# Patient Record
Sex: MALE | URBAN - METROPOLITAN AREA
[De-identification: names, ages, dates, MRNs, and addresses within clinical notes are randomized per-mention and may not be internally consistent; named-entity substitution may affect disease eponyms.]

---

## 2020-02-11 ENCOUNTER — LAB REQUISITION (OUTPATIENT)
Dept: LAB | Facility: HOSPITAL | Age: 80
End: 2020-02-11

## 2020-02-11 DIAGNOSIS — Z00.00 ROUTINE GENERAL MEDICAL EXAMINATION AT A HEALTH CARE FACILITY: ICD-10-CM

## 2020-02-11 LAB
ANION GAP SERPL CALCULATED.3IONS-SCNC: 16 MMOL/L (ref 5–15)
BACTERIA UR QL AUTO: ABNORMAL /HPF
BASOPHILS # BLD AUTO: 0.04 10*3/MM3 (ref 0–0.2)
BASOPHILS NFR BLD AUTO: 0.5 % (ref 0–1.5)
BILIRUB UR QL STRIP: NEGATIVE
BUN BLD-MCNC: 16 MG/DL (ref 8–23)
BUN/CREAT SERPL: 14 (ref 7–25)
CALCIUM SPEC-SCNC: 10.4 MG/DL (ref 8.6–10.5)
CHLORIDE SERPL-SCNC: 99 MMOL/L (ref 98–107)
CLARITY UR: ABNORMAL
CO2 SERPL-SCNC: 25 MMOL/L (ref 22–29)
COLOR UR: YELLOW
CREAT BLD-MCNC: 1.14 MG/DL (ref 0.76–1.27)
DEPRECATED RDW RBC AUTO: 45.5 FL (ref 37–54)
EOSINOPHIL # BLD AUTO: 0.17 10*3/MM3 (ref 0–0.4)
EOSINOPHIL NFR BLD AUTO: 2.2 % (ref 0.3–6.2)
ERYTHROCYTE [DISTWIDTH] IN BLOOD BY AUTOMATED COUNT: 13.2 % (ref 12.3–15.4)
GFR SERPL CREATININE-BSD FRML MDRD: 62 ML/MIN/1.73
GFR SERPL CREATININE-BSD FRML MDRD: 75 ML/MIN/1.73
GLUCOSE BLD-MCNC: 109 MG/DL (ref 65–99)
GLUCOSE UR STRIP-MCNC: NEGATIVE MG/DL
HCT VFR BLD AUTO: 44.8 % (ref 37.5–51)
HGB BLD-MCNC: 15 G/DL (ref 13–17.7)
HGB UR QL STRIP.AUTO: NEGATIVE
HYALINE CASTS UR QL AUTO: ABNORMAL /LPF
IMM GRANULOCYTES # BLD AUTO: 0.02 10*3/MM3 (ref 0–0.05)
IMM GRANULOCYTES NFR BLD AUTO: 0.3 % (ref 0–0.5)
KETONES UR QL STRIP: NEGATIVE
LEUKOCYTE ESTERASE UR QL STRIP.AUTO: ABNORMAL
LYMPHOCYTES # BLD AUTO: 1.54 10*3/MM3 (ref 0.7–3.1)
LYMPHOCYTES NFR BLD AUTO: 19.8 % (ref 19.6–45.3)
MCH RBC QN AUTO: 31.7 PG (ref 26.6–33)
MCHC RBC AUTO-ENTMCNC: 33.5 G/DL (ref 31.5–35.7)
MCV RBC AUTO: 94.7 FL (ref 79–97)
MONOCYTES # BLD AUTO: 0.67 10*3/MM3 (ref 0.1–0.9)
MONOCYTES NFR BLD AUTO: 8.6 % (ref 5–12)
NEUTROPHILS # BLD AUTO: 5.34 10*3/MM3 (ref 1.7–7)
NEUTROPHILS NFR BLD AUTO: 68.6 % (ref 42.7–76)
NITRITE UR QL STRIP: POSITIVE
NRBC BLD AUTO-RTO: 0 /100 WBC (ref 0–0.2)
PH UR STRIP.AUTO: 6.5 [PH] (ref 5–8)
PLATELET # BLD AUTO: 213 10*3/MM3 (ref 140–450)
PMV BLD AUTO: 11.7 FL (ref 6–12)
POTASSIUM BLD-SCNC: 4 MMOL/L (ref 3.5–5.2)
PROT UR QL STRIP: NEGATIVE
RBC # BLD AUTO: 4.73 10*6/MM3 (ref 4.14–5.8)
RBC # UR: ABNORMAL /HPF
REF LAB TEST METHOD: ABNORMAL
SODIUM BLD-SCNC: 140 MMOL/L (ref 136–145)
SP GR UR STRIP: 1.01 (ref 1–1.03)
SQUAMOUS #/AREA URNS HPF: ABNORMAL /HPF
UROBILINOGEN UR QL STRIP: ABNORMAL
WBC NRBC COR # BLD: 7.78 10*3/MM3 (ref 3.4–10.8)
WBC UR QL AUTO: ABNORMAL /HPF

## 2020-02-11 PROCEDURE — 81001 URINALYSIS AUTO W/SCOPE: CPT

## 2020-02-11 PROCEDURE — 85025 COMPLETE CBC W/AUTO DIFF WBC: CPT

## 2020-02-11 PROCEDURE — 80048 BASIC METABOLIC PNL TOTAL CA: CPT

## 2020-06-24 ENCOUNTER — LAB REQUISITION (OUTPATIENT)
Dept: LAB | Facility: HOSPITAL | Age: 80
End: 2020-06-24

## 2020-06-24 DIAGNOSIS — Z00.00 ROUTINE GENERAL MEDICAL EXAMINATION AT A HEALTH CARE FACILITY: ICD-10-CM

## 2020-06-24 LAB
BASOPHILS # BLD AUTO: 0.04 10*3/MM3 (ref 0–0.2)
BASOPHILS NFR BLD AUTO: 0.5 % (ref 0–1.5)
DEPRECATED RDW RBC AUTO: 43.8 FL (ref 37–54)
EOSINOPHIL # BLD AUTO: 0.13 10*3/MM3 (ref 0–0.4)
EOSINOPHIL NFR BLD AUTO: 1.8 % (ref 0.3–6.2)
ERYTHROCYTE [DISTWIDTH] IN BLOOD BY AUTOMATED COUNT: 13.1 % (ref 12.3–15.4)
HCT VFR BLD AUTO: 47.9 % (ref 37.5–51)
HGB BLD-MCNC: 15.8 G/DL (ref 13–17.7)
IMM GRANULOCYTES # BLD AUTO: 0.04 10*3/MM3 (ref 0–0.05)
IMM GRANULOCYTES NFR BLD AUTO: 0.5 % (ref 0–0.5)
LYMPHOCYTES # BLD AUTO: 1.33 10*3/MM3 (ref 0.7–3.1)
LYMPHOCYTES NFR BLD AUTO: 17.9 % (ref 19.6–45.3)
MCH RBC QN AUTO: 30.4 PG (ref 26.6–33)
MCHC RBC AUTO-ENTMCNC: 33 G/DL (ref 31.5–35.7)
MCV RBC AUTO: 92.3 FL (ref 79–97)
MONOCYTES # BLD AUTO: 0.88 10*3/MM3 (ref 0.1–0.9)
MONOCYTES NFR BLD AUTO: 11.9 % (ref 5–12)
NEUTROPHILS # BLD AUTO: 4.99 10*3/MM3 (ref 1.7–7)
NEUTROPHILS NFR BLD AUTO: 67.4 % (ref 42.7–76)
NRBC BLD AUTO-RTO: 0 /100 WBC (ref 0–0.2)
PLATELET # BLD AUTO: 327 10*3/MM3 (ref 140–450)
PMV BLD AUTO: 12.2 FL (ref 6–12)
RBC # BLD AUTO: 5.19 10*6/MM3 (ref 4.14–5.8)
WBC NRBC COR # BLD: 7.41 10*3/MM3 (ref 3.4–10.8)

## 2020-06-24 PROCEDURE — 85025 COMPLETE CBC W/AUTO DIFF WBC: CPT

## 2020-06-25 ENCOUNTER — LAB REQUISITION (OUTPATIENT)
Dept: LAB | Facility: HOSPITAL | Age: 80
End: 2020-06-25

## 2020-06-25 DIAGNOSIS — Z00.00 ROUTINE GENERAL MEDICAL EXAMINATION AT A HEALTH CARE FACILITY: ICD-10-CM

## 2020-06-25 LAB
ANION GAP SERPL CALCULATED.3IONS-SCNC: 13 MMOL/L (ref 5–15)
BUN BLD-MCNC: 42 MG/DL (ref 8–23)
BUN/CREAT SERPL: 37.2 (ref 7–25)
CALCIUM SPEC-SCNC: 9.7 MG/DL (ref 8.6–10.5)
CHLORIDE SERPL-SCNC: 104 MMOL/L (ref 98–107)
CO2 SERPL-SCNC: 23 MMOL/L (ref 22–29)
CREAT BLD-MCNC: 1.13 MG/DL (ref 0.76–1.27)
GFR SERPL CREATININE-BSD FRML MDRD: 62 ML/MIN/1.73
GFR SERPL CREATININE-BSD FRML MDRD: 76 ML/MIN/1.73
GLUCOSE BLD-MCNC: 101 MG/DL (ref 65–99)
POTASSIUM BLD-SCNC: 4.2 MMOL/L (ref 3.5–5.2)
SODIUM BLD-SCNC: 140 MMOL/L (ref 136–145)

## 2020-06-25 PROCEDURE — 80048 BASIC METABOLIC PNL TOTAL CA: CPT

## 2024-07-18 ENCOUNTER — DOCUMENTATION (OUTPATIENT)
Dept: FAMILY MEDICINE CLINIC | Facility: CLINIC | Age: 84
End: 2024-07-18
Payer: MEDICARE

## 2024-07-18 RX ORDER — ASPIRIN 81 MG/1
81 TABLET, CHEWABLE ORAL DAILY
COMMUNITY

## 2024-07-18 RX ORDER — TAMSULOSIN HYDROCHLORIDE 0.4 MG/1
1 CAPSULE ORAL DAILY
COMMUNITY

## 2024-07-18 RX ORDER — CITALOPRAM 20 MG/1
20 TABLET ORAL DAILY
COMMUNITY

## 2024-07-18 RX ORDER — AMLODIPINE BESYLATE 10 MG/1
10 TABLET ORAL DAILY
COMMUNITY

## 2024-07-18 RX ORDER — ATORVASTATIN CALCIUM 80 MG/1
80 TABLET, FILM COATED ORAL NIGHTLY
COMMUNITY

## 2024-07-24 NOTE — PROGRESS NOTES
Nursing Home History and Physical       Danyel BorjasDO isamar  [address] [place] [address] Phone: [phone]  Fax: [phone]     PATIENT NAME: Last Gibson                                                                          YOB: 1940           DATE OF SERVICE: 07/25/2024  FACILITY:  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    He was recently hospitalized at [facility] due to concerns of worsening left-sided weakness and altered mental status. A stroke workup, including a bubble study, did not reveal any shunt, revealed an ejection fraction of 70 to 75 percent. Due to debility and weakness, he was then transferred to this facility for further care and rehabilitation. On exam today, nurses report that he has been more fatigued and lethargic.  Yesterday he was doing better with PT. He reported no other associated symptoms, denying any chest pain, nausea, abdominal pain, or urinary issues. However, he did report one episode of diarrhea, which may have been triggered by medications yesterday.        PAST MEDICAL & SURGICAL HISTORY:   Past Medical History:   Diagnosis Date    CVA (cerebral vascular accident)     Dysarthria     Dyslipidemia     Facial droop,left     Hemiplegia and hemiparesis following cerebral infarction affecting left non-dominant side     History of cerebral artery stenosis     History of multiple cerebrovascular accidents (CVAs)     Hyperlipidemia     Hypertension     Left-sided weakness     Major depression     Moderate cognitive impairment     Stroke     Urinary retention       No past surgical history on file.      MEDICATIONS:  I have reviewed and reconciled the patients  medication list in the patients chart at the [facility] on 07/25/2024.      ALLERGIES:  No Known Allergies      SOCIAL HISTORY:  Social History     Socioeconomic History    Marital status:    Tobacco Use    Smoking status: Never    Smokeless tobacco: Never   Substance and Sexual Activity    Alcohol use: Not Currently     Alcohol/week: 2.0 standard drinks of alcohol     Types: 2 Glasses of wine per week     Comment: SOCIAL DRINKER, 2 GLASSES OF WINE WEEKLY    Drug use: Never    Sexual activity: Defer       FAMILY HISTORY:  Family History   Problem Relation Age of Onset    Aneurysm Mother     Coronary artery disease Brother         REVIEW OF SYSTEMS:  Review of Systems   Constitutional:  Negative for chills, fatigue and fever.   HENT:  Negative for congestion, ear pain, rhinorrhea, sinus pressure and sore throat.    Eyes:  Negative for visual disturbance.   Respiratory:  Negative for cough, chest tightness, shortness of breath and wheezing.    Cardiovascular:  Negative for chest pain, palpitations and leg swelling.   Gastrointestinal:  Negative for abdominal pain, blood in stool, constipation, diarrhea, nausea and vomiting.   Endocrine: Negative for polydipsia and polyuria.   Genitourinary:  Negative for dysuria and hematuria.   Musculoskeletal:  Negative for arthralgias and back pain.   Skin:  Negative for rash.   Neurological:  Negative for dizziness, light-headedness, numbness and headaches.   Psychiatric/Behavioral:  Negative for dysphoric mood and sleep disturbance. The patient is not nervous/anxious.          PHYSICAL EXAMINATION:   VITAL SIGNS: /64   Pulse 58   Temp 99 °F (37.2 °C)   Resp 16   Wt 67.1 kg (148 lb)   SpO2 94%     Physical Exam  Vitals and nursing note reviewed.   Constitutional:       Appearance: Normal appearance. He is well-developed.   HENT:      Head: Normocephalic and atraumatic.      Nose: Nose normal.      Mouth/Throat:      Mouth: Mucous membranes are  moist.      Pharynx: No oropharyngeal exudate.   Eyes:      General: No scleral icterus.     Conjunctiva/sclera: Conjunctivae normal.      Pupils: Pupils are equal, round, and reactive to light.   Neck:      Thyroid: No thyromegaly.   Cardiovascular:      Rate and Rhythm: Normal rate and regular rhythm.      Heart sounds: Normal heart sounds. No murmur heard.     No friction rub. No gallop.   Pulmonary:      Effort: Pulmonary effort is normal. No respiratory distress.      Breath sounds: Normal breath sounds. No wheezing.   Abdominal:      General: Bowel sounds are normal. There is no distension.      Palpations: Abdomen is soft.      Tenderness: There is no abdominal tenderness.   Musculoskeletal:         General: No deformity or signs of injury.      Cervical back: Normal range of motion and neck supple.   Lymphadenopathy:      Cervical: No cervical adenopathy.   Skin:     General: Skin is warm and dry.      Findings: No rash.   Neurological:      Mental Status: He is alert and oriented to person, place, and time.   Psychiatric:         Mood and Affect: Mood normal.         Behavior: Behavior normal.         RECORDS REVIEW:   Discharge Summary Harris Burch 7/17/24    ASSESSMENT   Diagnoses and all orders for this visit:    1. Recurrent cerebrovascular accidents (CVAs) (Primary)    2. Lethargy    3. Essential hypertension    4. Coronary artery disease of native heart with stable angina pectoris, unspecified vessel or lesion type    5. Anxiety and depression    6. Gastroesophageal reflux disease without esophagitis    7. Benign prostatic hyperplasia with urinary frequency        PLAN  Assessment & Plan  Fatigue - new concerns  This is only 1 day of fatigue symptoms. Symptoms will be monitored. Encouraged him to drink more fluids, eat well, and rest today. If no improvement in symptoms, further workup will be considered tomorrow.    Multiple cerebrovascular accidents.  No acute cerebrovascular accidents (CVAs)  were observed recently. Due to debility and weakness, physical therapy, occupational therapy, and rehab facility will be continued. Nursing care for activities of daily living (ADLs) will be continued.    Hypertension.  Stable on amlodipine.    Coronary artery disease/CVA.  Aspirin and statin will be continued.    Anxiety and depression.  Celexa will be continued.    GERD.  Pantoprazole will be continued.    Constipation.  Stable on current bowel regimen in the nursing facility.    Benign prostatic hyperplasia.  Tamsulosin will be continued.       52 min spent with direct patient care, review of medical chart, discussion with nursing staff, and medical decision making.       [x]  Discussed Patient in detail with nursing/staff, addressed all needs today.     [x]  Plan of Care Reviewed   [x]  PT/OT Reviewed   [x]  Order Changes  []  Discharge Plans Reviewed  [x]  Advance Directive on file with Nursing Home.   [x]  POA on file with Nursing Home.    [x]  Code Status listed and reviewed.     Danyel Razo DO.  7/29/2024      **Part of this note may be an electronic transcription/translation of spoken language to printed text using the Dragon Dictation System.**    Patient or patient representative verbalized consent for the use of Ambient Listening during the visit with  Danyel Razo DO for chart documentation. 7/29/2024  09:16 EDT

## 2024-07-25 ENCOUNTER — NURSING HOME (OUTPATIENT)
Dept: INTERNAL MEDICINE | Facility: CLINIC | Age: 84
End: 2024-07-25
Payer: MEDICARE

## 2024-07-25 DIAGNOSIS — R35.0 BENIGN PROSTATIC HYPERPLASIA WITH URINARY FREQUENCY: ICD-10-CM

## 2024-07-25 DIAGNOSIS — I10 ESSENTIAL HYPERTENSION: ICD-10-CM

## 2024-07-25 DIAGNOSIS — R53.83 LETHARGY: ICD-10-CM

## 2024-07-25 DIAGNOSIS — F41.9 ANXIETY AND DEPRESSION: ICD-10-CM

## 2024-07-25 DIAGNOSIS — I25.118 CORONARY ARTERY DISEASE OF NATIVE HEART WITH STABLE ANGINA PECTORIS, UNSPECIFIED VESSEL OR LESION TYPE: ICD-10-CM

## 2024-07-25 DIAGNOSIS — N40.1 BENIGN PROSTATIC HYPERPLASIA WITH URINARY FREQUENCY: ICD-10-CM

## 2024-07-25 DIAGNOSIS — K21.9 GASTROESOPHAGEAL REFLUX DISEASE WITHOUT ESOPHAGITIS: ICD-10-CM

## 2024-07-25 DIAGNOSIS — I63.9 RECURRENT CEREBROVASCULAR ACCIDENTS (CVAS): Primary | ICD-10-CM

## 2024-07-25 DIAGNOSIS — F32.A ANXIETY AND DEPRESSION: ICD-10-CM

## 2024-07-25 PROCEDURE — 99306 1ST NF CARE HIGH MDM 50: CPT | Performed by: INTERNAL MEDICINE

## 2024-07-25 NOTE — LETTER
Nursing Home History and Physical       Danyel BorjasDO isamar  [address] [place] [address] Phone: [phone]  Fax: [phone]     PATIENT NAME: Last Gibson                                                                          YOB: 1940           DATE OF SERVICE: 07/25/2024  FACILITY:  [facility]    CHIEF COMPLAINT:  Nursing facility admission      History of Present Illness  The patient is an 83-year-old male, new patient admission at nursing facility with a history of hypertension, hyperlipidemia, multiple cerebrovascular accidents (CVAs) with left-sided weakness, depression, and cognitive impairment.    He was recently hospitalized at [facility] due to concerns of worsening left-sided weakness and altered mental status. A stroke workup, including a bubble study, did not reveal any shunt, revealed an ejection fraction of 70 to 75 percent. Due to debility and weakness, he was then transferred to this facility for further care and rehabilitation. On exam today, nurses report that he has been more fatigued and lethargic.  Yesterday he was doing better with PT. He reported no other associated symptoms, denying any chest pain, nausea, abdominal pain, or urinary issues. However, he did report one episode of diarrhea, which may have been triggered by medications yesterday.        PAST MEDICAL & SURGICAL HISTORY:   Past Medical History:   Diagnosis Date    CVA (cerebral vascular accident)     Dysarthria     Dyslipidemia     Facial droop,left     Hemiplegia and hemiparesis following cerebral infarction affecting left non-dominant side     History of cerebral artery stenosis     History of multiple cerebrovascular accidents (CVAs)     Hyperlipidemia     Hypertension     Left-sided weakness     Major depression     Moderate cognitive impairment     Stroke     Urinary retention       No past surgical history on file.      MEDICATIONS:  I have reviewed and reconciled the patients  medication list in the patients chart at the [facility] nursing [facility] on 07/25/2024.      ALLERGIES:  No Known Allergies      SOCIAL HISTORY:  Social History     Socioeconomic History    Marital status:    Tobacco Use    Smoking status: Never    Smokeless tobacco: Never   Substance and Sexual Activity    Alcohol use: Not Currently     Alcohol/week: 2.0 standard drinks of alcohol     Types: 2 Glasses of wine per week     Comment: SOCIAL DRINKER, 2 GLASSES OF WINE WEEKLY    Drug use: Never    Sexual activity: Defer       FAMILY HISTORY:  Family History   Problem Relation Age of Onset    Aneurysm Mother     Coronary artery disease Brother         REVIEW OF SYSTEMS:  Review of Systems   Constitutional:  Negative for chills, fatigue and fever.   HENT:  Negative for congestion, ear pain, rhinorrhea, sinus pressure and sore throat.    Eyes:  Negative for visual disturbance.   Respiratory:  Negative for cough, chest tightness, shortness of breath and wheezing.    Cardiovascular:  Negative for chest pain, palpitations and leg swelling.   Gastrointestinal:  Negative for abdominal pain, blood in stool, constipation, diarrhea, nausea and vomiting.   Endocrine: Negative for polydipsia and polyuria.   Genitourinary:  Negative for dysuria and hematuria.   Musculoskeletal:  Negative for arthralgias and back pain.   Skin:  Negative for rash.   Neurological:  Negative for dizziness, light-headedness, numbness and headaches.   Psychiatric/Behavioral:  Negative for dysphoric mood and sleep disturbance. The patient is not nervous/anxious.          PHYSICAL EXAMINATION:   VITAL SIGNS: /64   Pulse 58   Temp 99 °F (37.2 °C)   Resp 16   Wt 67.1 kg (148 lb)   SpO2 94%     Physical Exam  Vitals and nursing note reviewed.   Constitutional:       Appearance: Normal appearance. He is well-developed.   HENT:      Head: Normocephalic and atraumatic.      Nose: Nose normal.      Mouth/Throat:      Mouth: Mucous membranes are  moist.      Pharynx: No oropharyngeal exudate.   Eyes:      General: No scleral icterus.     Conjunctiva/sclera: Conjunctivae normal.      Pupils: Pupils are equal, round, and reactive to light.   Neck:      Thyroid: No thyromegaly.   Cardiovascular:      Rate and Rhythm: Normal rate and regular rhythm.      Heart sounds: Normal heart sounds. No murmur heard.     No friction rub. No gallop.   Pulmonary:      Effort: Pulmonary effort is normal. No respiratory distress.      Breath sounds: Normal breath sounds. No wheezing.   Abdominal:      General: Bowel sounds are normal. There is no distension.      Palpations: Abdomen is soft.      Tenderness: There is no abdominal tenderness.   Musculoskeletal:         General: No deformity or signs of injury.      Cervical back: Normal range of motion and neck supple.   Lymphadenopathy:      Cervical: No cervical adenopathy.   Skin:     General: Skin is warm and dry.      Findings: No rash.   Neurological:      Mental Status: He is alert and oriented to person, place, and time.   Psychiatric:         Mood and Affect: Mood normal.         Behavior: Behavior normal.         RECORDS REVIEW:   Discharge Summary Harris Burch 7/17/24    ASSESSMENT   Diagnoses and all orders for this visit:    1. Recurrent cerebrovascular accidents (CVAs) (Primary)    2. Lethargy    3. Essential hypertension    4. Coronary artery disease of native heart with stable angina pectoris, unspecified vessel or lesion type    5. Anxiety and depression    6. Gastroesophageal reflux disease without esophagitis    7. Benign prostatic hyperplasia with urinary frequency        PLAN  Assessment & Plan  Fatigue - new concerns  This is only 1 day of fatigue symptoms. Symptoms will be monitored. Encouraged him to drink more fluids, eat well, and rest today. If no improvement in symptoms, further workup will be considered tomorrow.    Multiple cerebrovascular accidents.  No acute cerebrovascular accidents (CVAs)  were observed recently. Due to debility and weakness, physical therapy, occupational therapy, and rehab facility will be continued. Nursing care for activities of daily living (ADLs) will be continued.    Hypertension.  Stable on amlodipine.    Coronary artery disease/CVA.  Aspirin and statin will be continued.    Anxiety and depression.  Celexa will be continued.    GERD.  Pantoprazole will be continued.    Constipation.  Stable on current bowel regimen in the nursing facility.    Benign prostatic hyperplasia.  Tamsulosin will be continued.       52 min spent with direct patient care, review of medical chart, discussion with nursing staff, and medical decision making.       [x]  Discussed Patient in detail with nursing/staff, addressed all needs today.     [x]  Plan of Care Reviewed   [x]  PT/OT Reviewed   [x]  Order Changes  []  Discharge Plans Reviewed  [x]  Advance Directive on file with Nursing Home.   [x]  POA on file with Nursing Home.    [x]  Code Status listed and reviewed.     Danyel Razo DO.  7/29/2024      **Part of this note may be an electronic transcription/translation of spoken language to printed text using the Dragon Dictation System.**    Patient or patient representative verbalized consent for the use of Ambient Listening during the visit with  Danyel Razo DO for chart documentation. 7/29/2024  09:16 EDT

## 2024-07-26 VITALS
OXYGEN SATURATION: 94 % | TEMPERATURE: 99 F | HEART RATE: 58 BPM | DIASTOLIC BLOOD PRESSURE: 64 MMHG | SYSTOLIC BLOOD PRESSURE: 107 MMHG | WEIGHT: 148 LBS | RESPIRATION RATE: 16 BRPM

## 2024-08-28 NOTE — PROGRESS NOTES
Nursing Home Progress Note        Danyel Razo DO   [address] [place] [address] Phone: [phone]  Fax: [phone]     PATIENT NAME: Last Gibson                                                                          YOB: 1940           DATE OF SERVICE: 08/29/2024  FACILITY: [facility]       CHIEF COMPLAINT:  Chronic Medical Management      History of Present Illness  Patient was seen in the facility after he had just completed his shower.  He was sitting up comfortably in his wheelchair and seemed content.  He had his hearing aids out and had difficulty hearing.  Nurses report that the patient has been sleepy and has persistent fatigue.  He was recently diagnosed with UTI on the basis of difficulty urinating and having an elevated white count.  White count did improve after patient was treated with antibiotics however UA ended up being negative for any specific bacteria.  Patient seems to be doing well today.       PAST MEDICAL & SURGICAL HISTORY:   Past Medical History:   Diagnosis Date    CVA (cerebral vascular accident)     Dysarthria     Dyslipidemia     Facial droop,left     Hemiplegia and hemiparesis following cerebral infarction affecting left non-dominant side     History of cerebral artery stenosis     History of multiple cerebrovascular accidents (CVAs)     Hyperlipidemia     Hypertension     Left-sided weakness     Major depression     Moderate cognitive impairment     Stroke     Urinary retention       No past surgical history on file.      MEDICATIONS:  I have reviewed and reconciled the patients medication list in the patients chart at the skilled nursing facility today, 08/29/2024.      ALLERGIES:  No Known Allergies      SOCIAL HISTORY:  Social History     Socioeconomic History    Marital status:    Tobacco Use    Smoking status: Never    Smokeless tobacco: Never   Substance and Sexual Activity    Alcohol use: Not Currently     Alcohol/week: 2.0  standard drinks of alcohol     Types: 2 Glasses of wine per week     Comment: SOCIAL DRINKER, 2 GLASSES OF WINE WEEKLY    Drug use: Never    Sexual activity: Defer       FAMILY HISTORY:  Family History   Problem Relation Age of Onset    Aneurysm Mother     Coronary artery disease Brother        REVIEW OF SYSTEMS:  Review of Systems   Constitutional:  Negative for chills, fatigue and fever.   HENT:  Negative for congestion, ear pain, rhinorrhea, sinus pressure and sore throat.    Eyes:  Negative for visual disturbance.   Respiratory:  Negative for cough, chest tightness, shortness of breath and wheezing.    Cardiovascular:  Negative for chest pain, palpitations and leg swelling.   Gastrointestinal:  Negative for abdominal pain, blood in stool, constipation, diarrhea, nausea and vomiting.   Endocrine: Negative for polydipsia and polyuria.   Genitourinary:  Negative for dysuria and hematuria.   Musculoskeletal:  Negative for arthralgias and back pain.   Skin:  Negative for rash.   Neurological:  Negative for dizziness, light-headedness, numbness and headaches.   Psychiatric/Behavioral:  Negative for dysphoric mood and sleep disturbance. The patient is not nervous/anxious.         PHYSICAL EXAMINATION:     VITAL SIGNS:  /70   Pulse 70   Temp 97.8 °F (36.6 °C)   Resp 18   Wt 66.2 kg (146 lb)   SpO2 96%     Physical Exam  Vitals and nursing note reviewed.   Constitutional:       Appearance: Normal appearance. He is well-developed.   HENT:      Head: Normocephalic and atraumatic.      Nose: Nose normal.      Mouth/Throat:      Mouth: Mucous membranes are moist.      Pharynx: No oropharyngeal exudate.   Eyes:      General: No scleral icterus.     Conjunctiva/sclera: Conjunctivae normal.      Pupils: Pupils are equal, round, and reactive to light.   Neck:      Thyroid: No thyromegaly.   Cardiovascular:      Rate and Rhythm: Normal rate and regular rhythm.      Heart sounds: Normal heart sounds. No murmur heard.      No friction rub. No gallop.   Pulmonary:      Effort: Pulmonary effort is normal. No respiratory distress.      Breath sounds: Normal breath sounds. No wheezing.   Abdominal:      General: Bowel sounds are normal. There is no distension.      Palpations: Abdomen is soft.      Tenderness: There is no abdominal tenderness.   Musculoskeletal:         General: No deformity or signs of injury.      Cervical back: Normal range of motion and neck supple.   Lymphadenopathy:      Cervical: No cervical adenopathy.   Skin:     General: Skin is warm and dry.      Findings: No rash.   Neurological:      Mental Status: He is alert and oriented to person, place, and time.   Psychiatric:         Mood and Affect: Mood normal.         Behavior: Behavior normal.       RECORDS REVIEW:     ASSESSMENT     Diagnoses and all orders for this visit:    1. Lethargy (Primary)    2. Recurrent cerebrovascular accidents (CVAs)    3. Coronary artery disease of native heart with stable angina pectoris, unspecified vessel or lesion type    4. Essential hypertension    5. Anxiety and depression    6. Gastroesophageal reflux disease without esophagitis    7. Benign prostatic hyperplasia with urinary frequency        Assessment & Plan      Fatigue   -Seems to have improved with treatment of presumed UTI vs URI.  Mental status is at baseline.     Presbycusis  -.  Hearing difficulty does limit his ability to interact with patient's and may lead to confusion.     Multiple cerebrovascular accidents.  No acute cerebrovascular accidents (CVAs) were observed recently. Due to debility and weakness, physical therapy, occupational therapy, and rehab facility will be continued. Nursing care for activities of daily living (ADLs) will be continued.     Hypertension.  Stable on amlodipine.     Coronary artery disease/CVA.  Aspirin and statin will be continued.     Anxiety and depression.  Celexa will be continued.     GERD.  Pantoprazole will be continued.      Constipation.  Stable on current bowel regimen in the nursing facility.     Benign prostatic hyperplasia.  Tamsulosin will be continued.            [x]  Discussed Patient in detail with nursing/staff, addressed all needs today.     [x]  Plan of Care Reviewed   []  PT/OT Reviewed   []  Order Changes  []  Discharge Plans Reviewed  [x]  Advance Directive on file with Nursing Home.   [x]  POA on file with Nursing Home.    [x]  Code Status listed and reviewed.     I confirm accuracy of unchanged data/findings including physical exam and plan which have been carried forward from previous visit, as well as I have updated appropriately those that have changed        Danyel Razo DO.  8/31/2024      Patient or patient representative verbalized consent for the use of Ambient Listening during the visit with  Danyel Razo DO for chart documentation. 8/31/2024  16:57 EDT

## 2024-08-29 ENCOUNTER — NURSING HOME (OUTPATIENT)
Dept: INTERNAL MEDICINE | Facility: CLINIC | Age: 84
End: 2024-08-29
Payer: MEDICARE

## 2024-08-29 VITALS
RESPIRATION RATE: 18 BRPM | SYSTOLIC BLOOD PRESSURE: 120 MMHG | DIASTOLIC BLOOD PRESSURE: 70 MMHG | OXYGEN SATURATION: 96 % | HEART RATE: 70 BPM | TEMPERATURE: 97.8 F | WEIGHT: 146 LBS

## 2024-08-29 DIAGNOSIS — N40.1 BENIGN PROSTATIC HYPERPLASIA WITH URINARY FREQUENCY: ICD-10-CM

## 2024-08-29 DIAGNOSIS — F32.A ANXIETY AND DEPRESSION: ICD-10-CM

## 2024-08-29 DIAGNOSIS — I63.9 RECURRENT CEREBROVASCULAR ACCIDENTS (CVAS): ICD-10-CM

## 2024-08-29 DIAGNOSIS — K21.9 GASTROESOPHAGEAL REFLUX DISEASE WITHOUT ESOPHAGITIS: ICD-10-CM

## 2024-08-29 DIAGNOSIS — R53.83 LETHARGY: Primary | ICD-10-CM

## 2024-08-29 DIAGNOSIS — R35.0 BENIGN PROSTATIC HYPERPLASIA WITH URINARY FREQUENCY: ICD-10-CM

## 2024-08-29 DIAGNOSIS — I10 ESSENTIAL HYPERTENSION: ICD-10-CM

## 2024-08-29 DIAGNOSIS — F41.9 ANXIETY AND DEPRESSION: ICD-10-CM

## 2024-08-29 DIAGNOSIS — I25.118 CORONARY ARTERY DISEASE OF NATIVE HEART WITH STABLE ANGINA PECTORIS, UNSPECIFIED VESSEL OR LESION TYPE: ICD-10-CM

## 2024-08-29 NOTE — LETTER
Nursing Home Progress Note        Danyel Razo DO   [address] [place] [address] Phone: [phone]  Fax: [phone]     PATIENT NAME: Last Gibson                                                                          YOB: 1940           DATE OF SERVICE: 08/29/2024  FACILITY: [facility]       CHIEF COMPLAINT:  Chronic Medical Management      History of Present Illness  Patient was seen in the facility after he had just completed his shower.  He was sitting up comfortably in his wheelchair and seemed content.  He had his hearing aids out and had difficulty hearing.  Nurses report that the patient has been sleepy and has persistent fatigue.  He was recently diagnosed with UTI on the basis of difficulty urinating and having an elevated white count.  White count did improve after patient was treated with antibiotics however UA ended up being negative for any specific bacteria.  Patient seems to be doing well today.       PAST MEDICAL & SURGICAL HISTORY:   Past Medical History:   Diagnosis Date    CVA (cerebral vascular accident)     Dysarthria     Dyslipidemia     Facial droop,left     Hemiplegia and hemiparesis following cerebral infarction affecting left non-dominant side     History of cerebral artery stenosis     History of multiple cerebrovascular accidents (CVAs)     Hyperlipidemia     Hypertension     Left-sided weakness     Major depression     Moderate cognitive impairment     Stroke     Urinary retention       No past surgical history on file.      MEDICATIONS:  I have reviewed and reconciled the patients medication list in the patients chart at the skilled nursing facility today, 08/29/2024.      ALLERGIES:  No Known Allergies      SOCIAL HISTORY:  Social History     Socioeconomic History    Marital status:    Tobacco Use    Smoking status: Never    Smokeless tobacco: Never   Substance and Sexual Activity    Alcohol use: Not Currently     Alcohol/week: 2.0  standard drinks of alcohol     Types: 2 Glasses of wine per week     Comment: SOCIAL DRINKER, 2 GLASSES OF WINE WEEKLY    Drug use: Never    Sexual activity: Defer       FAMILY HISTORY:  Family History   Problem Relation Age of Onset    Aneurysm Mother     Coronary artery disease Brother        REVIEW OF SYSTEMS:  Review of Systems   Constitutional:  Negative for chills, fatigue and fever.   HENT:  Negative for congestion, ear pain, rhinorrhea, sinus pressure and sore throat.    Eyes:  Negative for visual disturbance.   Respiratory:  Negative for cough, chest tightness, shortness of breath and wheezing.    Cardiovascular:  Negative for chest pain, palpitations and leg swelling.   Gastrointestinal:  Negative for abdominal pain, blood in stool, constipation, diarrhea, nausea and vomiting.   Endocrine: Negative for polydipsia and polyuria.   Genitourinary:  Negative for dysuria and hematuria.   Musculoskeletal:  Negative for arthralgias and back pain.   Skin:  Negative for rash.   Neurological:  Negative for dizziness, light-headedness, numbness and headaches.   Psychiatric/Behavioral:  Negative for dysphoric mood and sleep disturbance. The patient is not nervous/anxious.         PHYSICAL EXAMINATION:     VITAL SIGNS:  /70   Pulse 70   Temp 97.8 °F (36.6 °C)   Resp 18   Wt 66.2 kg (146 lb)   SpO2 96%     Physical Exam  Vitals and nursing note reviewed.   Constitutional:       Appearance: Normal appearance. He is well-developed.   HENT:      Head: Normocephalic and atraumatic.      Nose: Nose normal.      Mouth/Throat:      Mouth: Mucous membranes are moist.      Pharynx: No oropharyngeal exudate.   Eyes:      General: No scleral icterus.     Conjunctiva/sclera: Conjunctivae normal.      Pupils: Pupils are equal, round, and reactive to light.   Neck:      Thyroid: No thyromegaly.   Cardiovascular:      Rate and Rhythm: Normal rate and regular rhythm.      Heart sounds: Normal heart sounds. No murmur heard.      No friction rub. No gallop.   Pulmonary:      Effort: Pulmonary effort is normal. No respiratory distress.      Breath sounds: Normal breath sounds. No wheezing.   Abdominal:      General: Bowel sounds are normal. There is no distension.      Palpations: Abdomen is soft.      Tenderness: There is no abdominal tenderness.   Musculoskeletal:         General: No deformity or signs of injury.      Cervical back: Normal range of motion and neck supple.   Lymphadenopathy:      Cervical: No cervical adenopathy.   Skin:     General: Skin is warm and dry.      Findings: No rash.   Neurological:      Mental Status: He is alert and oriented to person, place, and time.   Psychiatric:         Mood and Affect: Mood normal.         Behavior: Behavior normal.       RECORDS REVIEW:     ASSESSMENT     Diagnoses and all orders for this visit:    1. Lethargy (Primary)    2. Recurrent cerebrovascular accidents (CVAs)    3. Coronary artery disease of native heart with stable angina pectoris, unspecified vessel or lesion type    4. Essential hypertension    5. Anxiety and depression    6. Gastroesophageal reflux disease without esophagitis    7. Benign prostatic hyperplasia with urinary frequency        Assessment & Plan      Fatigue   -Seems to have improved with treatment of presumed UTI vs URI.  Mental status is at baseline.     Presbycusis  -.  Hearing difficulty does limit his ability to interact with patient's and may lead to confusion.     Multiple cerebrovascular accidents.  No acute cerebrovascular accidents (CVAs) were observed recently. Due to debility and weakness, physical therapy, occupational therapy, and rehab facility will be continued. Nursing care for activities of daily living (ADLs) will be continued.     Hypertension.  Stable on amlodipine.     Coronary artery disease/CVA.  Aspirin and statin will be continued.     Anxiety and depression.  Celexa will be continued.     GERD.  Pantoprazole will be continued.      Constipation.  Stable on current bowel regimen in the nursing facility.     Benign prostatic hyperplasia.  Tamsulosin will be continued.            [x]  Discussed Patient in detail with nursing/staff, addressed all needs today.     [x]  Plan of Care Reviewed   []  PT/OT Reviewed   []  Order Changes  []  Discharge Plans Reviewed  [x]  Advance Directive on file with Nursing Home.   [x]  POA on file with Nursing Home.    [x]  Code Status listed and reviewed.     I confirm accuracy of unchanged data/findings including physical exam and plan which have been carried forward from previous visit, as well as I have updated appropriately those that have changed        Danyel Razo DO.  8/31/2024      Patient or patient representative verbalized consent for the use of Ambient Listening during the visit with  Danyel Razo DO for chart documentation. 8/31/2024  16:57 EDT

## 2024-09-24 ENCOUNTER — NURSING HOME (OUTPATIENT)
Dept: INTERNAL MEDICINE | Facility: CLINIC | Age: 84
End: 2024-09-24
Payer: MEDICARE

## 2024-09-24 VITALS
RESPIRATION RATE: 18 BRPM | HEART RATE: 66 BPM | TEMPERATURE: 98.5 F | SYSTOLIC BLOOD PRESSURE: 147 MMHG | OXYGEN SATURATION: 97 % | DIASTOLIC BLOOD PRESSURE: 83 MMHG

## 2024-09-24 DIAGNOSIS — I25.118 CORONARY ARTERY DISEASE OF NATIVE HEART WITH STABLE ANGINA PECTORIS, UNSPECIFIED VESSEL OR LESION TYPE: ICD-10-CM

## 2024-09-24 DIAGNOSIS — N40.1 BENIGN PROSTATIC HYPERPLASIA WITH URINARY FREQUENCY: ICD-10-CM

## 2024-09-24 DIAGNOSIS — K21.9 GASTROESOPHAGEAL REFLUX DISEASE WITHOUT ESOPHAGITIS: ICD-10-CM

## 2024-09-24 DIAGNOSIS — R35.0 BENIGN PROSTATIC HYPERPLASIA WITH URINARY FREQUENCY: ICD-10-CM

## 2024-09-24 DIAGNOSIS — Z72.89 INAPPROPRIATE SEXUAL BEHAVIOR: Primary | ICD-10-CM

## 2024-09-24 DIAGNOSIS — R53.83 LETHARGY: ICD-10-CM

## 2024-09-24 DIAGNOSIS — F41.9 ANXIETY AND DEPRESSION: ICD-10-CM

## 2024-09-24 DIAGNOSIS — I10 ESSENTIAL HYPERTENSION: ICD-10-CM

## 2024-09-24 DIAGNOSIS — F32.A ANXIETY AND DEPRESSION: ICD-10-CM

## 2024-09-24 PROCEDURE — 99309 SBSQ NF CARE MODERATE MDM 30: CPT | Performed by: INTERNAL MEDICINE

## 2024-11-25 NOTE — PROGRESS NOTES
Nursing Home Progress Note        Danyel Razo DO   [address] [place] [address] Phone: [phone]  Fax: [phone]     PATIENT NAME: Last Gibson                                                                          YOB: 1940           DATE OF SERVICE: 11/26/2024  FACILITY: [facility]       CHIEF COMPLAINT:  Chronic Medical Management      History of Present Illness  The patient is an 84-year-old male who presents for a follow-up visit in the nursing facility.  Chronic issues were reviewed and have been holding stable.  He reports feeling well and has no concerns about his current medications. He also denies any gastrointestinal issues or breathing difficulties.       PAST MEDICAL & SURGICAL HISTORY:   Past Medical History:   Diagnosis Date    CVA (cerebral vascular accident)     Dysarthria     Dyslipidemia     Facial droop,left     Hemiplegia and hemiparesis following cerebral infarction affecting left non-dominant side     History of cerebral artery stenosis     History of multiple cerebrovascular accidents (CVAs)     Hyperlipidemia     Hypertension     Left-sided weakness     Major depression     Moderate cognitive impairment     Stroke     Urinary retention       No past surgical history on file.      MEDICATIONS:  I have reviewed and reconciled the patients medication list in the patients chart at the skilled nursing facility today, 11/26/2024.      ALLERGIES:  No Known Allergies      SOCIAL HISTORY:  Social History     Socioeconomic History    Marital status:    Tobacco Use    Smoking status: Never    Smokeless tobacco: Never   Substance and Sexual Activity    Alcohol use: Not Currently     Alcohol/week: 2.0 standard drinks of alcohol     Types: 2 Glasses of wine per week     Comment: SOCIAL DRINKER, 2 GLASSES OF WINE WEEKLY    Drug use: Never    Sexual activity: Defer       FAMILY HISTORY:  Family History   Problem Relation Age of Onset    Aneurysm Mother      Coronary artery disease Brother        REVIEW OF SYSTEMS:  Review of Systems   Constitutional:  Negative for chills, fatigue and fever.   HENT:  Negative for congestion, ear pain, rhinorrhea, sinus pressure and sore throat.    Eyes:  Negative for visual disturbance.   Respiratory:  Negative for cough, chest tightness, shortness of breath and wheezing.    Cardiovascular:  Negative for chest pain, palpitations and leg swelling.   Gastrointestinal:  Negative for abdominal pain, blood in stool, constipation, diarrhea, nausea and vomiting.   Endocrine: Negative for polydipsia and polyuria.   Genitourinary:  Negative for dysuria and hematuria.   Musculoskeletal:  Negative for arthralgias and back pain.   Skin:  Negative for rash.   Neurological:  Negative for dizziness, light-headedness, numbness and headaches.   Psychiatric/Behavioral:  Negative for dysphoric mood and sleep disturbance. The patient is not nervous/anxious.         PHYSICAL EXAMINATION:     VITAL SIGNS:  /73   Pulse 74   Temp 97.7 °F (36.5 °C)   Resp 18   Wt 66 kg (145 lb 8 oz)   SpO2 97%     Physical Exam  Vitals and nursing note reviewed.   Constitutional:       Appearance: Normal appearance. He is well-developed.   HENT:      Head: Normocephalic and atraumatic.      Comments: Hard of hearing     Nose: Nose normal.      Mouth/Throat:      Mouth: Mucous membranes are moist.      Pharynx: No oropharyngeal exudate.   Eyes:      General: No scleral icterus.     Conjunctiva/sclera: Conjunctivae normal.      Pupils: Pupils are equal, round, and reactive to light.   Neck:      Thyroid: No thyromegaly.   Cardiovascular:      Rate and Rhythm: Normal rate. Rhythm irregular.      Heart sounds: Murmur heard.      No friction rub. No gallop.   Pulmonary:      Effort: Pulmonary effort is normal. No respiratory distress.      Breath sounds: Normal breath sounds. No wheezing.   Abdominal:      General: Bowel sounds are normal. There is no distension.       Palpations: Abdomen is soft.      Tenderness: There is no abdominal tenderness.   Musculoskeletal:         General: No deformity or signs of injury.      Cervical back: Normal range of motion and neck supple.   Lymphadenopathy:      Cervical: No cervical adenopathy.   Skin:     General: Skin is warm and dry.      Findings: No rash.   Neurological:      Mental Status: He is alert and oriented to person, place, and time.   Psychiatric:         Mood and Affect: Mood normal.         Behavior: Behavior normal.       RECORDS REVIEW:     ASSESSMENT     Diagnoses and all orders for this visit:    1. Inappropriate sexual behavior (Primary)    2. Anxiety and depression    3. Recurrent cerebrovascular accidents (CVAs)    4. Coronary artery disease of native heart with stable angina pectoris, unspecified vessel or lesion type    5. Essential hypertension    6. Gastroesophageal reflux disease without esophagitis    7. Benign prostatic hyperplasia with urinary frequency        Assessment & Plan      Inappropriate Sexual behaviors / Anxiety and depression  - Stable since starting Prozac 10mg PO QHS.      Presbycusis  -.  Hearing difficulty does limit his ability to interact with patient's and may lead to confusion, patient does have a hearing device available.      Multiple cerebrovascular accidents.  No acute cerebrovascular accidents (CVAs) were observed recently. Due to debility and weakness, physical therapy, occupational therapy, and rehab facility will be continued. Nursing care for activities of daily living (ADLs) will be continued.     Hypertension.  Stable on amlodipine.     Coronary artery disease/CVA.  Aspirin and statin will be continued.     GERD.  Pantoprazole will be continued.     Constipation.  Stable on current bowel regimen in the nursing facility.     Benign prostatic hyperplasia.  Stable on Tamsulosin          [x]  Discussed Patient in detail with nursing/staff, addressed all needs today.     [x]  Plan of  Care Reviewed   []  PT/OT Reviewed   []  Order Changes  []  Discharge Plans Reviewed  [x]  Advance Directive on file with Nursing Home.   [x]  POA on file with Nursing Home.    [x]  Code Status listed and reviewed.     I confirm accuracy of unchanged data/findings including physical exam and plan which have been carried forward from previous visit, as well as I have updated appropriately those that have changed        Danyel Razo DO.  11/28/2024      Patient or patient representative verbalized consent for the use of Ambient Listening during the visit with  Danyel Razo DO for chart documentation. 11/28/2024  16:09 EST

## 2024-11-26 ENCOUNTER — NURSING HOME (OUTPATIENT)
Dept: INTERNAL MEDICINE | Facility: CLINIC | Age: 84
End: 2024-11-26
Payer: MEDICARE

## 2024-11-26 VITALS
WEIGHT: 145.5 LBS | HEART RATE: 74 BPM | OXYGEN SATURATION: 97 % | TEMPERATURE: 97.7 F | RESPIRATION RATE: 18 BRPM | SYSTOLIC BLOOD PRESSURE: 138 MMHG | DIASTOLIC BLOOD PRESSURE: 73 MMHG

## 2024-11-26 DIAGNOSIS — F32.A ANXIETY AND DEPRESSION: ICD-10-CM

## 2024-11-26 DIAGNOSIS — F41.9 ANXIETY AND DEPRESSION: ICD-10-CM

## 2024-11-26 DIAGNOSIS — I63.9 RECURRENT CEREBROVASCULAR ACCIDENTS (CVAS): ICD-10-CM

## 2024-11-26 DIAGNOSIS — R35.0 BENIGN PROSTATIC HYPERPLASIA WITH URINARY FREQUENCY: ICD-10-CM

## 2024-11-26 DIAGNOSIS — I25.118 CORONARY ARTERY DISEASE OF NATIVE HEART WITH STABLE ANGINA PECTORIS, UNSPECIFIED VESSEL OR LESION TYPE: ICD-10-CM

## 2024-11-26 DIAGNOSIS — N40.1 BENIGN PROSTATIC HYPERPLASIA WITH URINARY FREQUENCY: ICD-10-CM

## 2024-11-26 DIAGNOSIS — K21.9 GASTROESOPHAGEAL REFLUX DISEASE WITHOUT ESOPHAGITIS: ICD-10-CM

## 2024-11-26 DIAGNOSIS — I10 ESSENTIAL HYPERTENSION: ICD-10-CM

## 2024-11-26 DIAGNOSIS — Z72.89 INAPPROPRIATE SEXUAL BEHAVIOR: Primary | ICD-10-CM

## 2024-11-26 NOTE — LETTER
Nursing Home Progress Note        Danyel Razo DO   [address] [place] [address] Phone: [phone]  Fax: [phone]     PATIENT NAME: Last Gibson                                                                          YOB: 1940           DATE OF SERVICE: 11/26/2024  FACILITY: [facility]       CHIEF COMPLAINT:  Chronic Medical Management      History of Present Illness  The patient is an 84-year-old male who presents for a follow-up visit in the nursing facility.  Chronic issues were reviewed and have been holding stable.  He reports feeling well and has no concerns about his current medications. He also denies any gastrointestinal issues or breathing difficulties.       PAST MEDICAL & SURGICAL HISTORY:   Past Medical History:   Diagnosis Date   • CVA (cerebral vascular accident)    • Dysarthria    • Dyslipidemia    • Facial droop,left    • Hemiplegia and hemiparesis following cerebral infarction affecting left non-dominant side    • History of cerebral artery stenosis    • History of multiple cerebrovascular accidents (CVAs)    • Hyperlipidemia    • Hypertension    • Left-sided weakness    • Major depression    • Moderate cognitive impairment    • Stroke    • Urinary retention       No past surgical history on file.      MEDICATIONS:  I have reviewed and reconciled the patients medication list in the patients chart at the skilled nursing facility today, 11/26/2024.      ALLERGIES:  No Known Allergies      SOCIAL HISTORY:  Social History     Socioeconomic History   • Marital status:    Tobacco Use   • Smoking status: Never   • Smokeless tobacco: Never   Substance and Sexual Activity   • Alcohol use: Not Currently     Alcohol/week: 2.0 standard drinks of alcohol     Types: 2 Glasses of wine per week     Comment: SOCIAL DRINKER, 2 GLASSES OF WINE WEEKLY   • Drug use: Never   • Sexual activity: Defer       FAMILY HISTORY:  Family History   Problem Relation Age of Onset    • Aneurysm Mother    • Coronary artery disease Brother        REVIEW OF SYSTEMS:  Review of Systems   Constitutional:  Negative for chills, fatigue and fever.   HENT:  Negative for congestion, ear pain, rhinorrhea, sinus pressure and sore throat.    Eyes:  Negative for visual disturbance.   Respiratory:  Negative for cough, chest tightness, shortness of breath and wheezing.    Cardiovascular:  Negative for chest pain, palpitations and leg swelling.   Gastrointestinal:  Negative for abdominal pain, blood in stool, constipation, diarrhea, nausea and vomiting.   Endocrine: Negative for polydipsia and polyuria.   Genitourinary:  Negative for dysuria and hematuria.   Musculoskeletal:  Negative for arthralgias and back pain.   Skin:  Negative for rash.   Neurological:  Negative for dizziness, light-headedness, numbness and headaches.   Psychiatric/Behavioral:  Negative for dysphoric mood and sleep disturbance. The patient is not nervous/anxious.         PHYSICAL EXAMINATION:     VITAL SIGNS:  /73   Pulse 74   Temp 97.7 °F (36.5 °C)   Resp 18   Wt 66 kg (145 lb 8 oz)   SpO2 97%     Physical Exam  Vitals and nursing note reviewed.   Constitutional:       Appearance: Normal appearance. He is well-developed.   HENT:      Head: Normocephalic and atraumatic.      Comments: Hard of hearing     Nose: Nose normal.      Mouth/Throat:      Mouth: Mucous membranes are moist.      Pharynx: No oropharyngeal exudate.   Eyes:      General: No scleral icterus.     Conjunctiva/sclera: Conjunctivae normal.      Pupils: Pupils are equal, round, and reactive to light.   Neck:      Thyroid: No thyromegaly.   Cardiovascular:      Rate and Rhythm: Normal rate. Rhythm irregular.      Heart sounds: Murmur heard.      No friction rub. No gallop.   Pulmonary:      Effort: Pulmonary effort is normal. No respiratory distress.      Breath sounds: Normal breath sounds. No wheezing.   Abdominal:      General: Bowel sounds are normal. There  is no distension.      Palpations: Abdomen is soft.      Tenderness: There is no abdominal tenderness.   Musculoskeletal:         General: No deformity or signs of injury.      Cervical back: Normal range of motion and neck supple.   Lymphadenopathy:      Cervical: No cervical adenopathy.   Skin:     General: Skin is warm and dry.      Findings: No rash.   Neurological:      Mental Status: He is alert and oriented to person, place, and time.   Psychiatric:         Mood and Affect: Mood normal.         Behavior: Behavior normal.       RECORDS REVIEW:     ASSESSMENT     Diagnoses and all orders for this visit:    1. Inappropriate sexual behavior (Primary)    2. Anxiety and depression    3. Recurrent cerebrovascular accidents (CVAs)    4. Coronary artery disease of native heart with stable angina pectoris, unspecified vessel or lesion type    5. Essential hypertension    6. Gastroesophageal reflux disease without esophagitis    7. Benign prostatic hyperplasia with urinary frequency        Assessment & Plan      Inappropriate Sexual behaviors / Anxiety and depression  - Stable since starting Prozac 10mg PO QHS.      Presbycusis  -.  Hearing difficulty does limit his ability to interact with patient's and may lead to confusion, patient does have a hearing device available.      Multiple cerebrovascular accidents.  No acute cerebrovascular accidents (CVAs) were observed recently. Due to debility and weakness, physical therapy, occupational therapy, and rehab facility will be continued. Nursing care for activities of daily living (ADLs) will be continued.     Hypertension.  Stable on amlodipine.     Coronary artery disease/CVA.  Aspirin and statin will be continued.     GERD.  Pantoprazole will be continued.     Constipation.  Stable on current bowel regimen in the nursing facility.     Benign prostatic hyperplasia.  Stable on Tamsulosin          [x]  Discussed Patient in detail with nursing/staff, addressed all needs  today.     [x]  Plan of Care Reviewed   []  PT/OT Reviewed   []  Order Changes  []  Discharge Plans Reviewed  [x]  Advance Directive on file with Nursing Home.   [x]  POA on file with Nursing Home.    [x]  Code Status listed and reviewed.     I confirm accuracy of unchanged data/findings including physical exam and plan which have been carried forward from previous visit, as well as I have updated appropriately those that have changed        Danyel Razo DO.  11/28/2024      Patient or patient representative verbalized consent for the use of Ambient Listening during the visit with  Danyel Razo DO for chart documentation. 11/28/2024  16:09 EST

## 2025-01-28 ENCOUNTER — NURSING HOME (OUTPATIENT)
Dept: INTERNAL MEDICINE | Facility: CLINIC | Age: 85
End: 2025-01-28
Payer: MEDICARE

## 2025-01-28 VITALS
DIASTOLIC BLOOD PRESSURE: 75 MMHG | RESPIRATION RATE: 18 BRPM | HEART RATE: 77 BPM | WEIGHT: 154.6 LBS | TEMPERATURE: 97.7 F | SYSTOLIC BLOOD PRESSURE: 132 MMHG | OXYGEN SATURATION: 97 %

## 2025-01-28 DIAGNOSIS — F41.9 ANXIETY AND DEPRESSION: ICD-10-CM

## 2025-01-28 DIAGNOSIS — K21.9 GASTROESOPHAGEAL REFLUX DISEASE WITHOUT ESOPHAGITIS: ICD-10-CM

## 2025-01-28 DIAGNOSIS — I10 ESSENTIAL HYPERTENSION: ICD-10-CM

## 2025-01-28 DIAGNOSIS — F32.A ANXIETY AND DEPRESSION: ICD-10-CM

## 2025-01-28 DIAGNOSIS — Z72.89 INAPPROPRIATE SEXUAL BEHAVIOR: Primary | ICD-10-CM

## 2025-01-28 DIAGNOSIS — R35.0 BENIGN PROSTATIC HYPERPLASIA WITH URINARY FREQUENCY: ICD-10-CM

## 2025-01-28 DIAGNOSIS — I25.118 CORONARY ARTERY DISEASE OF NATIVE HEART WITH STABLE ANGINA PECTORIS, UNSPECIFIED VESSEL OR LESION TYPE: ICD-10-CM

## 2025-01-28 DIAGNOSIS — N40.1 BENIGN PROSTATIC HYPERPLASIA WITH URINARY FREQUENCY: ICD-10-CM

## 2025-01-28 PROCEDURE — 99308 SBSQ NF CARE LOW MDM 20: CPT | Performed by: INTERNAL MEDICINE

## 2025-01-28 NOTE — PROGRESS NOTES
Nursing Home Progress Note        Danyel Razo DO   [address] [place] [address] Phone: [phone]  Fax: [phone]     PATIENT NAME: Last Gibson                                                                          YOB: 1940           DATE OF SERVICE: 01/28/2025  FACILITY: [facility]       CHIEF COMPLAINT:  Chronic Medical Management      History of Present Illness  The patient is an 84-year-old male who was seen in the nursing facility today for chronic medical management.    He was sleepy on exam but upon awakening, remained nonverbal with me he continues to use a headset which helps with his hearing loss. Blood pressures have been stable. Patient has been compliant with taking his medications. BPH symptoms also remain well controlled.       PAST MEDICAL & SURGICAL HISTORY:   Past Medical History:   Diagnosis Date    CVA (cerebral vascular accident)     Dysarthria     Dyslipidemia     Facial droop,left     Hemiplegia and hemiparesis following cerebral infarction affecting left non-dominant side     History of cerebral artery stenosis     History of multiple cerebrovascular accidents (CVAs)     Hyperlipidemia     Hypertension     Left-sided weakness     Major depression     Moderate cognitive impairment     Stroke     Urinary retention       History reviewed. No pertinent surgical history.      MEDICATIONS:  I have reviewed and reconciled the patients medication list in the patients chart at the skilled nursing facility today, 01/28/2025.      ALLERGIES:  No Known Allergies      SOCIAL HISTORY:  Social History     Socioeconomic History    Marital status:    Tobacco Use    Smoking status: Never    Smokeless tobacco: Never   Substance and Sexual Activity    Alcohol use: Not Currently     Alcohol/week: 2.0 standard drinks of alcohol     Types: 2 Glasses of wine per week     Comment: SOCIAL DRINKER, 2 GLASSES OF WINE WEEKLY    Drug use: Never    Sexual activity:  Defer       FAMILY HISTORY:  Family History   Problem Relation Age of Onset    Aneurysm Mother     Coronary artery disease Brother        REVIEW OF SYSTEMS:  Review of Systems   Unable to perform ROS: Dementia   Constitutional:  Negative for chills, fatigue and fever.   HENT:  Negative for congestion, ear pain, rhinorrhea, sinus pressure and sore throat.    Eyes:  Negative for visual disturbance.   Respiratory:  Negative for cough, chest tightness, shortness of breath and wheezing.    Cardiovascular:  Negative for chest pain, palpitations and leg swelling.   Gastrointestinal:  Negative for abdominal pain, blood in stool, constipation, diarrhea, nausea and vomiting.   Endocrine: Negative for polydipsia and polyuria.   Genitourinary:  Negative for dysuria and hematuria.   Musculoskeletal:  Negative for arthralgias and back pain.   Skin:  Negative for rash.   Neurological:  Negative for dizziness, light-headedness, numbness and headaches.   Psychiatric/Behavioral:  Negative for dysphoric mood and sleep disturbance. The patient is not nervous/anxious.         PHYSICAL EXAMINATION:     VITAL SIGNS:  /75   Pulse 77   Temp 97.7 °F (36.5 °C)   Resp 18   Wt 70.1 kg (154 lb 9.6 oz)   SpO2 97%     Physical Exam  Vitals and nursing note reviewed.   Constitutional:       Appearance: Normal appearance. He is well-developed.   HENT:      Head: Normocephalic and atraumatic.      Comments: Hard of hearing     Nose: Nose normal.      Mouth/Throat:      Mouth: Mucous membranes are moist.      Pharynx: No oropharyngeal exudate.   Eyes:      General: No scleral icterus.     Conjunctiva/sclera: Conjunctivae normal.      Pupils: Pupils are equal, round, and reactive to light.   Neck:      Thyroid: No thyromegaly.   Cardiovascular:      Rate and Rhythm: Normal rate. Rhythm irregular.      Heart sounds: Murmur heard.      No friction rub. No gallop.   Pulmonary:      Effort: Pulmonary effort is normal. No respiratory distress.       Breath sounds: Normal breath sounds. No wheezing.   Abdominal:      General: Bowel sounds are normal. There is no distension.      Palpations: Abdomen is soft.      Tenderness: There is no abdominal tenderness.   Musculoskeletal:         General: No deformity or signs of injury.      Cervical back: Normal range of motion and neck supple.   Lymphadenopathy:      Cervical: No cervical adenopathy.   Skin:     General: Skin is warm and dry.      Findings: No rash.   Neurological:      Mental Status: He is alert and oriented to person, place, and time.   Psychiatric:         Mood and Affect: Mood normal.         Behavior: Behavior normal.       RECORDS REVIEW:   Results      ASSESSMENT     Diagnoses and all orders for this visit:    1. Inappropriate sexual behavior (Primary)    2. Anxiety and depression    3. Coronary artery disease of native heart with stable angina pectoris, unspecified vessel or lesion type    4. Essential hypertension    5. Benign prostatic hyperplasia with urinary frequency    6. Gastroesophageal reflux disease without esophagitis        Assessment & Plan  1. Chronic medical management.       Presbycusis  -.  Hearing difficulty does limit his ability to interact with patient's and may lead to confusion, patient does have a hearing device available.      Multiple cerebrovascular accidents.  No acute cerebrovascular accidents (CVAs) were observed recently. Due to debility and weakness, physical therapy, occupational therapy, and rehab facility will be continued. Nursing care for activities of daily living (ADLs) will be continued.    Inappropriate Sexual behaviors / Anxiety and depression  - Stable since starting Prozac 10mg PO QHS.       Hypertension.  Stable on amlodipine.     Coronary artery disease/CVA.  Aspirin and statin will be continued.     GERD.  Pantoprazole will be continued.     Constipation.  Stable on current bowel regimen in the nursing facility.     Benign prostatic  hyperplasia.  Stable on Tamsulosin          [x]  Discussed Patient in detail with nursing/staff, addressed all needs today.     [x]  Plan of Care Reviewed   []  PT/OT Reviewed   []  Order Changes  []  Discharge Plans Reviewed  [x]  Advance Directive on file with Nursing Home.   [x]  POA on file with Nursing Home.    [x]  Code Status listed and reviewed.     I confirm accuracy of unchanged data/findings including physical exam and plan which have been carried forward from previous visit, as well as I have updated appropriately those that have changed        Danyel Razo DO.  1/31/2025      Patient or patient representative verbalized consent for the use of Ambient Listening during the visit with  Danyel Razo DO for chart documentation. 1/31/2025  10:24 EST

## 2025-01-28 NOTE — LETTER
Nursing Home Progress Note        Danyel Razo DO   [address] [place] [address] Phone: [phone]  Fax: [phone]     PATIENT NAME: Last Gibson                                                                          YOB: 1940           DATE OF SERVICE: 01/28/2025  FACILITY: [facility]       CHIEF COMPLAINT:  Chronic Medical Management      History of Present Illness  The patient is an 84-year-old male who was seen in the nursing facility today for chronic medical management.    He was sleepy on exam but upon awakening, remained nonverbal with me he continues to use a headset which helps with his hearing loss. Blood pressures have been stable. Patient has been compliant with taking his medications. BPH symptoms also remain well controlled.       PAST MEDICAL & SURGICAL HISTORY:   Past Medical History:   Diagnosis Date   • CVA (cerebral vascular accident)    • Dysarthria    • Dyslipidemia    • Facial droop,left    • Hemiplegia and hemiparesis following cerebral infarction affecting left non-dominant side    • History of cerebral artery stenosis    • History of multiple cerebrovascular accidents (CVAs)    • Hyperlipidemia    • Hypertension    • Left-sided weakness    • Major depression    • Moderate cognitive impairment    • Stroke    • Urinary retention       No past surgical history on file.      MEDICATIONS:  I have reviewed and reconciled the patients medication list in the patients chart at the skilled nursing facility today, 01/28/2025.      ALLERGIES:  No Known Allergies      SOCIAL HISTORY:  Social History     Socioeconomic History   • Marital status:    Tobacco Use   • Smoking status: Never   • Smokeless tobacco: Never   Substance and Sexual Activity   • Alcohol use: Not Currently     Alcohol/week: 2.0 standard drinks of alcohol     Types: 2 Glasses of wine per week     Comment: SOCIAL DRINKER, 2 GLASSES OF WINE WEEKLY   • Drug use: Never   • Sexual  activity: Defer       FAMILY HISTORY:  Family History   Problem Relation Age of Onset   • Aneurysm Mother    • Coronary artery disease Brother        REVIEW OF SYSTEMS:  Review of Systems   Unable to perform ROS: Dementia   Constitutional:  Negative for chills, fatigue and fever.   HENT:  Negative for congestion, ear pain, rhinorrhea, sinus pressure and sore throat.    Eyes:  Negative for visual disturbance.   Respiratory:  Negative for cough, chest tightness, shortness of breath and wheezing.    Cardiovascular:  Negative for chest pain, palpitations and leg swelling.   Gastrointestinal:  Negative for abdominal pain, blood in stool, constipation, diarrhea, nausea and vomiting.   Endocrine: Negative for polydipsia and polyuria.   Genitourinary:  Negative for dysuria and hematuria.   Musculoskeletal:  Negative for arthralgias and back pain.   Skin:  Negative for rash.   Neurological:  Negative for dizziness, light-headedness, numbness and headaches.   Psychiatric/Behavioral:  Negative for dysphoric mood and sleep disturbance. The patient is not nervous/anxious.         PHYSICAL EXAMINATION:     VITAL SIGNS:  /75   Pulse 77   Temp 97.7 °F (36.5 °C)   Resp 18   Wt 70.1 kg (154 lb 9.6 oz)   SpO2 97%     Physical Exam  Vitals and nursing note reviewed.   Constitutional:       Appearance: Normal appearance. He is well-developed.   HENT:      Head: Normocephalic and atraumatic.      Comments: Hard of hearing     Nose: Nose normal.      Mouth/Throat:      Mouth: Mucous membranes are moist.      Pharynx: No oropharyngeal exudate.   Eyes:      General: No scleral icterus.     Conjunctiva/sclera: Conjunctivae normal.      Pupils: Pupils are equal, round, and reactive to light.   Neck:      Thyroid: No thyromegaly.   Cardiovascular:      Rate and Rhythm: Normal rate. Rhythm irregular.      Heart sounds: Murmur heard.      No friction rub. No gallop.   Pulmonary:      Effort: Pulmonary effort is normal. No respiratory  distress.      Breath sounds: Normal breath sounds. No wheezing.   Abdominal:      General: Bowel sounds are normal. There is no distension.      Palpations: Abdomen is soft.      Tenderness: There is no abdominal tenderness.   Musculoskeletal:         General: No deformity or signs of injury.      Cervical back: Normal range of motion and neck supple.   Lymphadenopathy:      Cervical: No cervical adenopathy.   Skin:     General: Skin is warm and dry.      Findings: No rash.   Neurological:      Mental Status: He is alert and oriented to person, place, and time.   Psychiatric:         Mood and Affect: Mood normal.         Behavior: Behavior normal.     RECORDS REVIEW:   Results      ASSESSMENT     There are no diagnoses linked to this encounter.    Assessment & Plan  1. Chronic medical management.       Presbycusis  -.  Hearing difficulty does limit his ability to interact with patient's and may lead to confusion, patient does have a hearing device available.      Multiple cerebrovascular accidents.  No acute cerebrovascular accidents (CVAs) were observed recently. Due to debility and weakness, physical therapy, occupational therapy, and rehab facility will be continued. Nursing care for activities of daily living (ADLs) will be continued.    Inappropriate Sexual behaviors / Anxiety and depression  - Stable since starting Prozac 10mg PO QHS.       Hypertension.  Stable on amlodipine.     Coronary artery disease/CVA.  Aspirin and statin will be continued.     GERD.  Pantoprazole will be continued.     Constipation.  Stable on current bowel regimen in the nursing facility.     Benign prostatic hyperplasia.  Stable on Tamsulosin          [x]  Discussed Patient in detail with nursing/staff, addressed all needs today.     [x]  Plan of Care Reviewed   []  PT/OT Reviewed   []  Order Changes  []  Discharge Plans Reviewed  [x]  Advance Directive on file with Nursing Home.   [x]  POA on file with Nursing Home.    [x]  Code  Status listed and reviewed.     I confirm accuracy of unchanged data/findings including physical exam and plan which have been carried forward from previous visit, as well as I have updated appropriately those that have changed        Nidhi Costello MA.  1/28/2025      Patient or patient representative verbalized consent for the use of Ambient Listening during the visit with  Danyel Razo DO for chart documentation. 1/28/2025  10:24 EST

## 2025-03-26 NOTE — PROGRESS NOTES
Nursing Home Progress Note        Danyel Razo DO   [address] [place] [address] Phone: [phone]  Fax: [phone]     PATIENT NAME: Last Gibson                                                                          YOB: 1940           DATE OF SERVICE: 03/27/2025  FACILITY: [facility]       CHIEF COMPLAINT:  Chronic Medical Management      History of Present Illness  The patient is an 84-year-old male who was seen in the nursing facility for chronic medical management. Unfortunately, over the last month or so, he has refused all of his medications, including amlodipine, atorvastatin, melatonin, Remeron, and tamsulosin. Despite being off these medications, his blood pressure has remained within a reasonable range, and he has been resting comfortably at night. His oral intake has been reasonable, and his weight has remained stable.           PAST MEDICAL & SURGICAL HISTORY:   Past Medical History:   Diagnosis Date    CVA (cerebral vascular accident)     Dysarthria     Dyslipidemia     Facial droop,left     Hemiplegia and hemiparesis following cerebral infarction affecting left non-dominant side     History of cerebral artery stenosis     History of multiple cerebrovascular accidents (CVAs)     Hyperlipidemia     Hypertension     Left-sided weakness     Major depression     Moderate cognitive impairment     Stroke     Urinary retention       No past surgical history on file.      MEDICATIONS:  I have reviewed and reconciled the patients medication list in the patients chart at the skilled nursing facility today, 03/27/2025.      ALLERGIES:  No Known Allergies      SOCIAL HISTORY:  Social History     Socioeconomic History    Marital status:    Tobacco Use    Smoking status: Never    Smokeless tobacco: Never   Substance and Sexual Activity    Alcohol use: Not Currently     Alcohol/week: 2.0 standard drinks of alcohol     Types: 2 Glasses of wine per week     Comment:  SOCIAL DRINKER, 2 GLASSES OF WINE WEEKLY    Drug use: Never    Sexual activity: Defer       FAMILY HISTORY:  Family History   Problem Relation Age of Onset    Aneurysm Mother     Coronary artery disease Brother        REVIEW OF SYSTEMS:  Review of Systems   Unable to perform ROS: Dementia   Constitutional:  Negative for chills, fatigue and fever.   HENT:  Negative for congestion, ear pain, rhinorrhea, sinus pressure and sore throat.    Eyes:  Negative for visual disturbance.   Respiratory:  Negative for cough, chest tightness, shortness of breath and wheezing.    Cardiovascular:  Negative for chest pain, palpitations and leg swelling.   Gastrointestinal:  Negative for abdominal pain, blood in stool, constipation, diarrhea, nausea and vomiting.   Endocrine: Negative for polydipsia and polyuria.   Genitourinary:  Negative for dysuria and hematuria.   Musculoskeletal:  Negative for arthralgias and back pain.   Skin:  Negative for rash.   Neurological:  Negative for dizziness, light-headedness, numbness and headaches.   Psychiatric/Behavioral:  Negative for dysphoric mood and sleep disturbance. The patient is not nervous/anxious.         PHYSICAL EXAMINATION:     VITAL SIGNS:  /88   Pulse 76   Temp 97.9 °F (36.6 °C)   Resp 18   SpO2 95%     Physical Exam  Vitals and nursing note reviewed.   Constitutional:       Appearance: Normal appearance. He is well-developed.   HENT:      Head: Normocephalic and atraumatic.      Comments: Hard of hearing     Nose: Nose normal.      Mouth/Throat:      Mouth: Mucous membranes are moist.      Pharynx: No oropharyngeal exudate.   Eyes:      General: No scleral icterus.     Conjunctiva/sclera: Conjunctivae normal.      Pupils: Pupils are equal, round, and reactive to light.   Neck:      Thyroid: No thyromegaly.   Cardiovascular:      Rate and Rhythm: Normal rate. Rhythm irregular.      Heart sounds: Murmur heard.      No friction rub. No gallop.   Pulmonary:      Effort:  Pulmonary effort is normal. No respiratory distress.      Breath sounds: Normal breath sounds. No wheezing.   Abdominal:      General: Bowel sounds are normal. There is no distension.      Palpations: Abdomen is soft.      Tenderness: There is no abdominal tenderness.   Musculoskeletal:         General: No deformity or signs of injury.      Cervical back: Normal range of motion and neck supple.   Lymphadenopathy:      Cervical: No cervical adenopathy.   Skin:     General: Skin is warm and dry.      Findings: No rash.   Neurological:      Mental Status: He is alert and oriented to person, place, and time.   Psychiatric:         Mood and Affect: Mood normal.         Behavior: Behavior normal.       RECORDS REVIEW:   Results  Laboratory Studies  Labs were reviewed on 01/16/2025. Hemoglobin 13, platelets 225, WBC 5.54. Creatinine 1.14, potassium 4.3, sodium 141.    ASSESSMENT     Diagnoses and all orders for this visit:    1. Inappropriate sexual behavior (Primary)    2. Anxiety and depression    3. Coronary artery disease of native heart with stable angina pectoris, unspecified vessel or lesion type    4. Essential hypertension    5. Benign prostatic hyperplasia with urinary frequency    6. Gastroesophageal reflux disease without esophagitis    7. Recurrent cerebrovascular accidents (CVAs)        Assessment & Plan  Chronic medical management.  Given his current stability and refusal to take multiple medications, the focus will shift towards comfort and medication reduction. Amlodipine for hypertension, atorvastatin for hyperlipidemia, melatonin and Remeron for sleep and depression, and tamsulosin for urinary issues will be discontinued.       Presbycusis  -.  Hearing difficulty does limit his ability to interact with patient's and may lead to confusion, patient does have a hearing device available.      Multiple cerebrovascular accidents.  No acute cerebrovascular accidents (CVAs) were observed recently. Due to  debility and weakness, physical therapy, occupational therapy, and rehab facility will be continued. Nursing care for activities of daily living (ADLs) will be continued.      Constipation.  Stable on PRN medications    Below conditions were treated but patient has refused all his medications consistently and despite this has been stable.  In consideration of comfort and patient wishes, medications were stopped.     Inappropriate Sexual behaviors / Anxiety and depression  - now off prozac    Hypertension.  - now off amlodipine.     Coronary artery disease/CVA.  - stopped aspirin and statin     GERD.  - stopped pantoprazole     Benign prostatic hyperplasia.  Stopped Tamsulosin        35 min spent with direct patient care, review of medical chart, discussion with nursing staff, and medical decision making.     [x]  Discussed Patient in detail with nursing/staff, addressed all needs today.     [x]  Plan of Care Reviewed   []  PT/OT Reviewed   [x]  Order Changes  []  Discharge Plans Reviewed  [x]  Advance Directive on file with Nursing Home.   [x]  POA on file with Nursing Home.    [x]  Code Status listed and reviewed.     I confirm accuracy of unchanged data/findings including physical exam and plan which have been carried forward from previous visit, as well as I have updated appropriately those that have changed        Danyel Razo DO.  4/3/2025      Patient or patient representative verbalized consent for the use of Ambient Listening during the visit with  Danyel Razo DO for chart documentation. 4/3/2025  14:18 EDT

## 2025-03-27 ENCOUNTER — NURSING HOME (OUTPATIENT)
Dept: INTERNAL MEDICINE | Facility: CLINIC | Age: 85
End: 2025-03-27
Payer: MEDICARE

## 2025-03-27 VITALS
RESPIRATION RATE: 18 BRPM | SYSTOLIC BLOOD PRESSURE: 129 MMHG | HEART RATE: 76 BPM | OXYGEN SATURATION: 95 % | DIASTOLIC BLOOD PRESSURE: 88 MMHG | TEMPERATURE: 97.9 F

## 2025-03-27 DIAGNOSIS — I25.118 CORONARY ARTERY DISEASE OF NATIVE HEART WITH STABLE ANGINA PECTORIS, UNSPECIFIED VESSEL OR LESION TYPE: ICD-10-CM

## 2025-03-27 DIAGNOSIS — F41.9 ANXIETY AND DEPRESSION: ICD-10-CM

## 2025-03-27 DIAGNOSIS — I63.9 RECURRENT CEREBROVASCULAR ACCIDENTS (CVAS): ICD-10-CM

## 2025-03-27 DIAGNOSIS — R35.0 BENIGN PROSTATIC HYPERPLASIA WITH URINARY FREQUENCY: ICD-10-CM

## 2025-03-27 DIAGNOSIS — I10 ESSENTIAL HYPERTENSION: ICD-10-CM

## 2025-03-27 DIAGNOSIS — Z72.89 INAPPROPRIATE SEXUAL BEHAVIOR: Primary | ICD-10-CM

## 2025-03-27 DIAGNOSIS — N40.1 BENIGN PROSTATIC HYPERPLASIA WITH URINARY FREQUENCY: ICD-10-CM

## 2025-03-27 DIAGNOSIS — K21.9 GASTROESOPHAGEAL REFLUX DISEASE WITHOUT ESOPHAGITIS: ICD-10-CM

## 2025-03-27 DIAGNOSIS — F32.A ANXIETY AND DEPRESSION: ICD-10-CM

## 2025-03-27 NOTE — LETTER
Nursing Home Progress Note        Danyel Razo DO   [address] [place] [address] Phone: [phone]  Fax: [phone]     PATIENT NAME: Last Gibson                                                                          YOB: 1940           DATE OF SERVICE: 03/27/2025  FACILITY: [facility]       CHIEF COMPLAINT:  Chronic Medical Management      History of Present Illness  The patient is an 84-year-old male who was seen in the nursing facility for chronic medical management. Unfortunately, over the last month or so, he has refused all of his medications, including amlodipine, atorvastatin, melatonin, Remeron, and tamsulosin. Despite being off these medications, his blood pressure has remained within a reasonable range, and he has been resting comfortably at night. His oral intake has been reasonable, and his weight has remained stable.           PAST MEDICAL & SURGICAL HISTORY:   Past Medical History:   Diagnosis Date   • CVA (cerebral vascular accident)    • Dysarthria    • Dyslipidemia    • Facial droop,left    • Hemiplegia and hemiparesis following cerebral infarction affecting left non-dominant side    • History of cerebral artery stenosis    • History of multiple cerebrovascular accidents (CVAs)    • Hyperlipidemia    • Hypertension    • Left-sided weakness    • Major depression    • Moderate cognitive impairment    • Stroke    • Urinary retention       No past surgical history on file.      MEDICATIONS:  I have reviewed and reconciled the patients medication list in the patients chart at the skilled nursing facility today, 03/27/2025.      ALLERGIES:  No Known Allergies      SOCIAL HISTORY:  Social History     Socioeconomic History   • Marital status:    Tobacco Use   • Smoking status: Never   • Smokeless tobacco: Never   Substance and Sexual Activity   • Alcohol use: Not Currently     Alcohol/week: 2.0 standard drinks of alcohol     Types: 2 Glasses of wine per  week     Comment: SOCIAL DRINKER, 2 GLASSES OF WINE WEEKLY   • Drug use: Never   • Sexual activity: Defer       FAMILY HISTORY:  Family History   Problem Relation Age of Onset   • Aneurysm Mother    • Coronary artery disease Brother        REVIEW OF SYSTEMS:  Review of Systems   Unable to perform ROS: Dementia   Constitutional:  Negative for chills, fatigue and fever.   HENT:  Negative for congestion, ear pain, rhinorrhea, sinus pressure and sore throat.    Eyes:  Negative for visual disturbance.   Respiratory:  Negative for cough, chest tightness, shortness of breath and wheezing.    Cardiovascular:  Negative for chest pain, palpitations and leg swelling.   Gastrointestinal:  Negative for abdominal pain, blood in stool, constipation, diarrhea, nausea and vomiting.   Endocrine: Negative for polydipsia and polyuria.   Genitourinary:  Negative for dysuria and hematuria.   Musculoskeletal:  Negative for arthralgias and back pain.   Skin:  Negative for rash.   Neurological:  Negative for dizziness, light-headedness, numbness and headaches.   Psychiatric/Behavioral:  Negative for dysphoric mood and sleep disturbance. The patient is not nervous/anxious.         PHYSICAL EXAMINATION:     VITAL SIGNS:  /88   Pulse 76   Temp 97.9 °F (36.6 °C)   Resp 18   SpO2 95%     Physical Exam  Vitals and nursing note reviewed.   Constitutional:       Appearance: Normal appearance. He is well-developed.   HENT:      Head: Normocephalic and atraumatic.      Comments: Hard of hearing     Nose: Nose normal.      Mouth/Throat:      Mouth: Mucous membranes are moist.      Pharynx: No oropharyngeal exudate.   Eyes:      General: No scleral icterus.     Conjunctiva/sclera: Conjunctivae normal.      Pupils: Pupils are equal, round, and reactive to light.   Neck:      Thyroid: No thyromegaly.   Cardiovascular:      Rate and Rhythm: Normal rate. Rhythm irregular.      Heart sounds: Murmur heard.      No friction rub. No gallop.    Pulmonary:      Effort: Pulmonary effort is normal. No respiratory distress.      Breath sounds: Normal breath sounds. No wheezing.   Abdominal:      General: Bowel sounds are normal. There is no distension.      Palpations: Abdomen is soft.      Tenderness: There is no abdominal tenderness.   Musculoskeletal:         General: No deformity or signs of injury.      Cervical back: Normal range of motion and neck supple.   Lymphadenopathy:      Cervical: No cervical adenopathy.   Skin:     General: Skin is warm and dry.      Findings: No rash.   Neurological:      Mental Status: He is alert and oriented to person, place, and time.   Psychiatric:         Mood and Affect: Mood normal.         Behavior: Behavior normal.       RECORDS REVIEW:   Results  Laboratory Studies  Labs were reviewed on 01/16/2025. Hemoglobin 13, platelets 225, WBC 5.54. Creatinine 1.14, potassium 4.3, sodium 141.    ASSESSMENT     Diagnoses and all orders for this visit:    1. Inappropriate sexual behavior (Primary)    2. Anxiety and depression    3. Coronary artery disease of native heart with stable angina pectoris, unspecified vessel or lesion type    4. Essential hypertension    5. Benign prostatic hyperplasia with urinary frequency    6. Gastroesophageal reflux disease without esophagitis    7. Recurrent cerebrovascular accidents (CVAs)        Assessment & Plan  Chronic medical management.  Given his current stability and refusal to take multiple medications, the focus will shift towards comfort and medication reduction. Amlodipine for hypertension, atorvastatin for hyperlipidemia, melatonin and Remeron for sleep and depression, and tamsulosin for urinary issues will be discontinued.       Presbycusis  -.  Hearing difficulty does limit his ability to interact with patient's and may lead to confusion, patient does have a hearing device available.      Multiple cerebrovascular accidents.  No acute cerebrovascular accidents (CVAs) were  observed recently. Due to debility and weakness, physical therapy, occupational therapy, and rehab facility will be continued. Nursing care for activities of daily living (ADLs) will be continued.      Constipation.  Stable on PRN medications    Below conditions were treated but patient has refused all his medications consistently and despite this has been stable.  In consideration of comfort and patient wishes, medications were stopped.     Inappropriate Sexual behaviors / Anxiety and depression  - now off prozac    Hypertension.  - now off amlodipine.     Coronary artery disease/CVA.  - stopped aspirin and statin     GERD.  - stopped pantoprazole     Benign prostatic hyperplasia.  Stopped Tamsulosin        35 min spent with direct patient care, review of medical chart, discussion with nursing staff, and medical decision making.     [x]  Discussed Patient in detail with nursing/staff, addressed all needs today.     [x]  Plan of Care Reviewed   []  PT/OT Reviewed   [x]  Order Changes  []  Discharge Plans Reviewed  [x]  Advance Directive on file with Nursing Home.   [x]  POA on file with Nursing Home.    [x]  Code Status listed and reviewed.     I confirm accuracy of unchanged data/findings including physical exam and plan which have been carried forward from previous visit, as well as I have updated appropriately those that have changed        Danyel Razo DO.  4/3/2025      Patient or patient representative verbalized consent for the use of Ambient Listening during the visit with  Danyel Razo DO for chart documentation. 4/3/2025  14:18 EDT

## 2025-06-02 ENCOUNTER — NURSING HOME (OUTPATIENT)
Dept: INTERNAL MEDICINE | Facility: CLINIC | Age: 85
End: 2025-06-02
Payer: MEDICARE

## 2025-06-02 VITALS
HEIGHT: 66 IN | WEIGHT: 150.8 LBS | BODY MASS INDEX: 24.23 KG/M2 | TEMPERATURE: 97.8 F | DIASTOLIC BLOOD PRESSURE: 63 MMHG | HEART RATE: 74 BPM | OXYGEN SATURATION: 97 % | RESPIRATION RATE: 18 BRPM | SYSTOLIC BLOOD PRESSURE: 153 MMHG

## 2025-06-02 DIAGNOSIS — K21.9 GASTROESOPHAGEAL REFLUX DISEASE WITHOUT ESOPHAGITIS: ICD-10-CM

## 2025-06-02 DIAGNOSIS — I10 ESSENTIAL HYPERTENSION: ICD-10-CM

## 2025-06-02 DIAGNOSIS — F41.9 ANXIETY AND DEPRESSION: Primary | ICD-10-CM

## 2025-06-02 DIAGNOSIS — F32.A ANXIETY AND DEPRESSION: Primary | ICD-10-CM

## 2025-06-02 DIAGNOSIS — I63.9 RECURRENT CEREBROVASCULAR ACCIDENTS (CVAS): ICD-10-CM

## 2025-06-02 DIAGNOSIS — N40.1 BENIGN PROSTATIC HYPERPLASIA WITH URINARY FREQUENCY: ICD-10-CM

## 2025-06-02 DIAGNOSIS — R35.0 BENIGN PROSTATIC HYPERPLASIA WITH URINARY FREQUENCY: ICD-10-CM

## 2025-06-02 DIAGNOSIS — I25.118 CORONARY ARTERY DISEASE OF NATIVE HEART WITH STABLE ANGINA PECTORIS, UNSPECIFIED VESSEL OR LESION TYPE: ICD-10-CM

## 2025-06-02 DIAGNOSIS — Z72.89 INAPPROPRIATE SEXUAL BEHAVIOR: ICD-10-CM

## 2025-06-02 NOTE — LETTER
Nursing Home Progress Note        Danyel Razo DO   [address] [place] [address] Phone: [phone]  Fax: [phone]     PATIENT NAME: Last Gibson                                                                          YOB: 1940           DATE OF SERVICE: 06/02/2025  FACILITY: [facility]       CHIEF COMPLAINT:  Chronic Medical Management      History of Present Illness         PAST MEDICAL & SURGICAL HISTORY:   Past Medical History:   Diagnosis Date    CVA (cerebral vascular accident)     Dysarthria     Dyslipidemia     Facial droop,left     Hemiplegia and hemiparesis following cerebral infarction affecting left non-dominant side     History of cerebral artery stenosis     History of multiple cerebrovascular accidents (CVAs)     Hyperlipidemia     Hypertension     Left-sided weakness     Major depression     Moderate cognitive impairment     Stroke     Urinary retention       No past surgical history on file.      MEDICATIONS:  I have reviewed and reconciled the patients medication list in the patients chart at the skilled nursing facility today, 06/02/2025.      ALLERGIES:  No Known Allergies      SOCIAL HISTORY:  Social History     Socioeconomic History    Marital status:    Tobacco Use    Smoking status: Never    Smokeless tobacco: Never   Substance and Sexual Activity    Alcohol use: Not Currently     Alcohol/week: 2.0 standard drinks of alcohol     Types: 2 Glasses of wine per week     Comment: SOCIAL DRINKER, 2 GLASSES OF WINE WEEKLY    Drug use: Never    Sexual activity: Defer       FAMILY HISTORY:  Family History   Problem Relation Age of Onset    Aneurysm Mother     Coronary artery disease Brother        REVIEW OF SYSTEMS:  Review of Systems   Unable to perform ROS: Dementia   Constitutional:  Negative for chills, fatigue and fever.   HENT:  Negative for congestion, ear pain, rhinorrhea, sinus pressure and sore throat.    Eyes:  Negative for visual  "disturbance.   Respiratory:  Negative for cough, chest tightness, shortness of breath and wheezing.    Cardiovascular:  Negative for chest pain, palpitations and leg swelling.   Gastrointestinal:  Negative for abdominal pain, blood in stool, constipation, diarrhea, nausea and vomiting.   Endocrine: Negative for polydipsia and polyuria.   Genitourinary:  Negative for dysuria and hematuria.   Musculoskeletal:  Negative for arthralgias and back pain.   Skin:  Negative for rash.   Neurological:  Negative for dizziness, light-headedness, numbness and headaches.   Psychiatric/Behavioral:  Negative for dysphoric mood and sleep disturbance. The patient is not nervous/anxious.       PHYSICAL EXAMINATION:     VITAL SIGNS:  /63   Pulse 74   Temp 97.8 °F (36.6 °C)   Resp 18   Ht 167.6 cm (66\")   Wt 68.4 kg (150 lb 12.8 oz)   SpO2 97%   BMI 24.34 kg/m²     Physical Exam  Vitals and nursing note reviewed.   Constitutional:       Appearance: Normal appearance. He is well-developed.   HENT:      Head: Normocephalic and atraumatic.      Comments: Hard of hearing     Nose: Nose normal.      Mouth/Throat:      Mouth: Mucous membranes are moist.      Pharynx: No oropharyngeal exudate.   Eyes:      General: No scleral icterus.     Conjunctiva/sclera: Conjunctivae normal.      Pupils: Pupils are equal, round, and reactive to light.   Neck:      Thyroid: No thyromegaly.   Cardiovascular:      Rate and Rhythm: Normal rate. Rhythm irregular.      Heart sounds: Murmur heard.      No friction rub. No gallop.   Pulmonary:      Effort: Pulmonary effort is normal. No respiratory distress.      Breath sounds: Normal breath sounds. No wheezing.   Abdominal:      General: Bowel sounds are normal. There is no distension.      Palpations: Abdomen is soft.      Tenderness: There is no abdominal tenderness.   Musculoskeletal:         General: No deformity or signs of injury.      Cervical back: Normal range of motion and neck supple. "   Lymphadenopathy:      Cervical: No cervical adenopathy.   Skin:     General: Skin is warm and dry.      Findings: No rash.   Neurological:      Mental Status: He is alert and oriented to person, place, and time.   Psychiatric:         Mood and Affect: Mood normal.         Behavior: Behavior normal.     RECORDS REVIEW:   Results      ASSESSMENT     There are no diagnoses linked to this encounter.    Assessment & Plan         Presbycusis  -.  Hearing difficulty does limit his ability to interact with patient's and may lead to confusion, patient does have a hearing device available.      Multiple cerebrovascular accidents.  No acute cerebrovascular accidents (CVAs) were observed recently. Due to debility and weakness, physical therapy, occupational therapy, and rehab facility will be continued. Nursing care for activities of daily living (ADLs) will be continued.      Constipation.  Stable on PRN medications    Below conditions were treated but patient has refused all his medications consistently and despite this has been stable.  In consideration of comfort and patient wishes, medications were stopped.     Inappropriate Sexual behaviors / Anxiety and depression  - now off prozac    Hypertension.  - now off amlodipine.     Coronary artery disease/CVA.  - stopped aspirin and statin     GERD.  - stopped pantoprazole     Benign prostatic hyperplasia.  Stopped Tamsulosin        [x]  Discussed Patient in detail with nursing/staff, addressed all needs today.     [x]  Plan of Care Reviewed   []  PT/OT Reviewed   []  Order Changes  []  Discharge Plans Reviewed  [x]  Advance Directive on file with Nursing Home.   [x]  POA on file with Nursing Home.    [x]  Code Status listed and reviewed.     I confirm accuracy of unchanged data/findings including physical exam and plan which have been carried forward from previous visit, as well as I have updated appropriately those that have changed        Nidhi Costello MA.  6/2/2025       Patient or patient representative verbalized consent for the use of Ambient Listening during the visit with  Danyel Razo DO for chart documentation. 6/2/2025  13:36 EDT

## 2025-06-02 NOTE — PROGRESS NOTES
Nursing Home Progress Note        Danyel Razo DO   [address] [place] [address] Phone: [phone]  Fax: [phone]     PATIENT NAME: Last Gibson                                                                          YOB: 1940           DATE OF SERVICE: 06/02/2025  FACILITY: [facility]       CHIEF COMPLAINT:  Chronic Medical Management      History of Present Illness  Patient was seen today in the nursing facility for routine medical management.  On exam today, mood behaviors have been stable.  Patient has been compliant with taking his medications.  Blood pressures have been well-controlled.       PAST MEDICAL & SURGICAL HISTORY:   Past Medical History:   Diagnosis Date    CVA (cerebral vascular accident)     Dysarthria     Dyslipidemia     Facial droop,left     Hemiplegia and hemiparesis following cerebral infarction affecting left non-dominant side     History of cerebral artery stenosis     History of multiple cerebrovascular accidents (CVAs)     Hyperlipidemia     Hypertension     Left-sided weakness     Major depression     Moderate cognitive impairment     Stroke     Urinary retention       No past surgical history on file.      MEDICATIONS:  I have reviewed and reconciled the patients medication list in the patients chart at the skilled nursing facility today, 06/02/2025.      ALLERGIES:  No Known Allergies      SOCIAL HISTORY:  Social History     Socioeconomic History    Marital status:    Tobacco Use    Smoking status: Never    Smokeless tobacco: Never   Substance and Sexual Activity    Alcohol use: Not Currently     Alcohol/week: 2.0 standard drinks of alcohol     Types: 2 Glasses of wine per week     Comment: SOCIAL DRINKER, 2 GLASSES OF WINE WEEKLY    Drug use: Never    Sexual activity: Defer       FAMILY HISTORY:  Family History   Problem Relation Age of Onset    Aneurysm Mother     Coronary artery disease Brother        REVIEW OF SYSTEMS:  Review of  "Systems   Unable to perform ROS: Dementia   Constitutional:  Negative for chills, fatigue and fever.   HENT:  Negative for congestion, ear pain, rhinorrhea, sinus pressure and sore throat.    Eyes:  Negative for visual disturbance.   Respiratory:  Negative for cough, chest tightness, shortness of breath and wheezing.    Cardiovascular:  Negative for chest pain, palpitations and leg swelling.   Gastrointestinal:  Negative for abdominal pain, blood in stool, constipation, diarrhea, nausea and vomiting.   Endocrine: Negative for polydipsia and polyuria.   Genitourinary:  Negative for dysuria and hematuria.   Musculoskeletal:  Negative for arthralgias and back pain.   Skin:  Negative for rash.   Neurological:  Negative for dizziness, light-headedness, numbness and headaches.   Psychiatric/Behavioral:  Negative for dysphoric mood and sleep disturbance. The patient is not nervous/anxious.         PHYSICAL EXAMINATION:     VITAL SIGNS:  /63   Pulse 74   Temp 97.8 °F (36.6 °C)   Resp 18   Ht 167.6 cm (66\")   Wt 68.4 kg (150 lb 12.8 oz)   SpO2 97%   BMI 24.34 kg/m²     Physical Exam  Vitals and nursing note reviewed.   Constitutional:       Appearance: Normal appearance. He is well-developed.   HENT:      Head: Normocephalic and atraumatic.      Comments: Hard of hearing     Nose: Nose normal.      Mouth/Throat:      Mouth: Mucous membranes are moist.      Pharynx: No oropharyngeal exudate.   Eyes:      General: No scleral icterus.     Conjunctiva/sclera: Conjunctivae normal.      Pupils: Pupils are equal, round, and reactive to light.   Neck:      Thyroid: No thyromegaly.   Cardiovascular:      Rate and Rhythm: Normal rate. Rhythm irregular.      Heart sounds: Murmur heard.      No friction rub. No gallop.   Pulmonary:      Effort: Pulmonary effort is normal. No respiratory distress.      Breath sounds: Normal breath sounds. No wheezing.   Abdominal:      General: Bowel sounds are normal. There is no " distension.      Palpations: Abdomen is soft.      Tenderness: There is no abdominal tenderness.   Musculoskeletal:         General: No deformity or signs of injury.      Cervical back: Normal range of motion and neck supple.   Lymphadenopathy:      Cervical: No cervical adenopathy.   Skin:     General: Skin is warm and dry.      Findings: No rash.   Neurological:      Mental Status: He is alert and oriented to person, place, and time.   Psychiatric:         Mood and Affect: Mood normal.         Behavior: Behavior normal.       RECORDS REVIEW:   Results      ASSESSMENT     Diagnoses and all orders for this visit:    1. Anxiety and depression (Primary)    2. Coronary artery disease of native heart with stable angina pectoris, unspecified vessel or lesion type    3. Essential hypertension    4. Benign prostatic hyperplasia with urinary frequency    5. Gastroesophageal reflux disease without esophagitis    6. Inappropriate sexual behavior    7. Recurrent cerebrovascular accidents (CVAs)        Assessment & Plan       Medication compliance - due to stable symptoms off medications and patient's refusal of medications, most scheduled medications have been stopped in the past.   Presbycusis  -.  Hearing difficulty does limit his ability to interact with patient's and may lead to confusion, patient does have a hearing device available.      Multiple cerebrovascular accidents.  - cont supportive care in the nursing facility.     Constipation.  Stable on PRN medications    Below conditions were treated but patient has refused all his medications consistently and despite this has been stable.  In consideration of comfort and patient wishes, medications were stopped.     Inappropriate Sexual behaviors / Anxiety and depression  - stable, off Prozac at this time.     Hypertension.  - now off amlodipine.     Coronary artery disease/CVA.  - stopped aspirin and statin     GERD.  - stopped pantoprazole     Benign prostatic  hyperplasia.  Currently off tamsulosin. Symptoms are stable.           [x]  Discussed Patient in detail with nursing/staff, addressed all needs today.     [x]  Plan of Care Reviewed   []  PT/OT Reviewed   []  Order Changes  []  Discharge Plans Reviewed  [x]  Advance Directive on file with Nursing Home.   [x]  POA on file with Nursing Home.    [x]  Code Status listed and reviewed.     I confirm accuracy of unchanged data/findings including physical exam and plan which have been carried forward from previous visit, as well as I have updated appropriately those that have changed        Danyel Razo DO.  6/13/2025      Patient or patient representative verbalized consent for the use of Ambient Listening during the visit with  Danyel Razo DO for chart documentation. 6/13/2025  13:36 EDT